# Patient Record
Sex: FEMALE | Race: BLACK OR AFRICAN AMERICAN | Employment: UNEMPLOYED | URBAN - METROPOLITAN AREA
[De-identification: names, ages, dates, MRNs, and addresses within clinical notes are randomized per-mention and may not be internally consistent; named-entity substitution may affect disease eponyms.]

---

## 2017-01-01 ENCOUNTER — APPOINTMENT (OUTPATIENT)
Dept: GENERAL RADIOLOGY | Age: 62
End: 2017-01-01
Attending: EMERGENCY MEDICINE
Payer: MEDICARE

## 2017-01-01 ENCOUNTER — HOSPITAL ENCOUNTER (EMERGENCY)
Age: 62
End: 2017-01-28
Attending: EMERGENCY MEDICINE | Admitting: EMERGENCY MEDICINE
Payer: MEDICARE

## 2017-01-01 DIAGNOSIS — I46.9 CARDIAC ARREST (HCC): Primary | ICD-10-CM

## 2017-01-01 LAB — TROPONIN I BLD-MCNC: 0.05 NG/ML (ref 0–0.08)

## 2017-01-01 PROCEDURE — 80047 BASIC METABLC PNL IONIZED CA: CPT

## 2017-01-01 PROCEDURE — 74011250636 HC RX REV CODE- 250/636: Performed by: EMERGENCY MEDICINE

## 2017-01-01 PROCEDURE — 93005 ELECTROCARDIOGRAM TRACING: CPT

## 2017-01-01 PROCEDURE — 92950 HEART/LUNG RESUSCITATION CPR: CPT

## 2017-01-01 PROCEDURE — 99285 EMERGENCY DEPT VISIT HI MDM: CPT

## 2017-01-01 PROCEDURE — 74011000250 HC RX REV CODE- 250: Performed by: EMERGENCY MEDICINE

## 2017-01-01 PROCEDURE — 84484 ASSAY OF TROPONIN QUANT: CPT

## 2017-01-01 RX ORDER — EPINEPHRINE 0.1 MG/ML
1 INJECTION INTRACARDIAC; INTRAVENOUS
Status: COMPLETED | OUTPATIENT
Start: 2017-01-01 | End: 2017-01-01

## 2017-01-01 RX ORDER — EPINEPHRINE 0.1 MG/ML
1 INJECTION INTRACARDIAC; INTRAVENOUS
Status: DISCONTINUED | OUTPATIENT
Start: 2017-01-01 | End: 2017-01-01 | Stop reason: HOSPADM

## 2017-01-01 RX ORDER — EPINEPHRINE HCL IN DEXTROSE 5% 4MG/250ML
PLASTIC BAG, INJECTION (ML) INTRAVENOUS
Status: DISCONTINUED
Start: 2017-01-01 | End: 2017-01-01 | Stop reason: HOSPADM

## 2017-01-01 RX ORDER — EPINEPHRINE HCL IN DEXTROSE 5% 4MG/250ML
1-10 PLASTIC BAG, INJECTION (ML) INTRAVENOUS
Status: DISCONTINUED | OUTPATIENT
Start: 2017-01-01 | End: 2017-01-01 | Stop reason: HOSPADM

## 2017-01-01 RX ORDER — SODIUM BICARBONATE 84 MG/ML
50 INJECTION, SOLUTION INTRAVENOUS
Status: COMPLETED | OUTPATIENT
Start: 2017-01-01 | End: 2017-01-01

## 2017-01-01 RX ORDER — AMIODARONE HYDROCHLORIDE 150 MG/3ML
300 INJECTION, SOLUTION INTRAVENOUS
Status: COMPLETED | OUTPATIENT
Start: 2017-01-01 | End: 2017-01-01

## 2017-01-01 RX ADMIN — AMIODARONE HYDROCHLORIDE 300 MG: 50 INJECTION, SOLUTION INTRAVENOUS at 14:02

## 2017-01-01 RX ADMIN — EPINEPHRINE 1 MG: 0.1 INJECTION, SOLUTION ENDOTRACHEAL; INTRACARDIAC; INTRAVENOUS at 14:21

## 2017-01-01 RX ADMIN — SODIUM BICARBONATE 50 MEQ: 84 INJECTION, SOLUTION INTRAVENOUS at 14:54

## 2017-01-01 RX ADMIN — EPINEPHRINE 1 MG: 0.1 INJECTION, SOLUTION ENDOTRACHEAL; INTRACARDIAC; INTRAVENOUS at 13:59

## 2017-01-01 RX ADMIN — EPINEPHRINE 1 MG: 0.1 INJECTION, SOLUTION ENDOTRACHEAL; INTRACARDIAC; INTRAVENOUS at 14:11

## 2017-01-28 NOTE — ED NOTES
Patient arrived in room 14 at this time with CPR in progress. According to EMS patient sitting in  seat with  in passenger and patient slumped over.    EMs found patient with agonal  respirations and in PEA

## 2017-01-28 NOTE — PROGRESS NOTES
responded to Death of  Micahel Topete, who was a 62 y.o.,female,     The  provided the following Interventions for Patient Family:  Provided crisis pastoral care, pastoral support and grief interventions. Offered prayers on behalf of the patient family. Chart reviewed. Plan:  Chaplains will continue to follow and will provide pastoral care on an as needed/requested basis and grief support for the family. Dami Koch M.Div.   [facility] [address]  [phone]

## 2017-01-28 NOTE — ED NOTES
Consult:  Discussed care with Dr. Bonner. Standard discussion; including history of patients chief complaint, available diagnostic results, and treatment course.   He will fill out the death certificate

## 2017-01-28 NOTE — ED PROVIDER NOTES
HPI Comments: 2:10 PM Boy Hernandes is a 62 y.o. Female with a hx of diabetes, HTN, and stroke presenting to the ED via EMS in cardiac arrest that began 30 mins prior to ED arrival. The patient was found unresponsive at the scene. Per EMS, spouse who was also at scene states that she got in the car, started the ignition, and collapsed. EMS reports agonal breathing upon arrival, blood glucose of 370. EMS also states that in route the patient lost pulses and was given 1mg of epi 5 mins prior to ED arrival, intubated with a 7.5 in tube without complication (ETV increase to 24). There are no other concerns at this time. Initial rhythm PEA, no shocks, ACLS for 20 mins. The history is provided by the EMS personnel and a friend. Past Medical History:   Diagnosis Date    Diabetes ([address]     Hypertension     PBA (pseudobulbar affect)     Stroke [facility])        Past Surgical History:   Procedure Laterality Date    Vascular surgery procedure unlist  2013     To help with circulation         No family history on file. Social History     Social History    Marital status:      Spouse name: N/A    Number of children: N/A    Years of education: N/A     Occupational History    Not on file. Social History Main Topics    Smoking status: Current Every Day Smoker     Packs/day: 0.50    Smokeless tobacco: Never Used    Alcohol use No    Drug use: No    Sexual activity: Not on file     Other Topics Concern    Not on file     Social History Narrative         ALLERGIES: Review of patient's allergies indicates no known allergies. Review of Systems   Unable to perform ROS: Acuity of condition       There were no vitals filed for this visit.          Physical Exam   Constitutional:   General:  Obtunded, no spontaneous movements  Head:  Normocephalic atraumatic  Eyes:  Pupils 7 mm, unresponsive  Nose:  Inspection grossly normal  Ears:  Grossly normal to inspection  Mouth:  Mucous membrane members moist  Neck:  Trachea midline, no asymmetry no JVD  Chest:  Grossly normal inspection  Pulmonary:  Symmetric breath sounds appreciated with ventilation  Cardiovascular:  Absent peripheral pulses without CPR  Abdomen: Soft, nondistended  Extremities:  Grossly normal to inspection  Neurologic:  Obtunded, GCS 3        MDM  Number of Diagnoses or Management Options  Diagnosis management comments: Patient seen immediatley on arrival.      Confirmed pulseless    Patient transferred to [facility], CPR continued, placed on cardiac monitor    Please seen nursing documented code sheet for specific medications administered. ACLS guidlness followed. Patient had a episode of ventricular tachycardia did shock him once and had a brief return of spontaneous circulation. EKG was suspicious for ST MI, there were inferior elevations, STEMI code was called, but the patient had lost pulses and prior to cardiology evaluation. CPR was resumed approximately 15 minutes, the presenting rhythm was PEA, the final result was PEA, patient had very poor prognosis since no return of spontaneous circulation and had been down for more than one hour. Time of death called at 0     reports that she had been feeling LEFT leg pain prior to the event    Disposition:          Portions of this chart were created with Dragon medical speech to text program.   Unrecognized errors may be present.       ED Course       Procedures

## 2017-01-28 NOTE — ED NOTES
1349  Patient arrived CPR in progress  1353 epi cpr   1354 bicarb cpr  1356 cpr cont   1358 pulse check  PEA  Cont cpr  1359 epi cpr  1400 pulse v-tach shock 200j cpr  1403 pulse check  PEA cpr   1411 epi cpr  1413 pulse check PEA  Cont cpr  5972 epi  cpr  0333 patient still in PEA   Ultrasound  No cardiac activity pupils 7 fixed.     1423

## 2017-01-29 LAB
BUN BLD-MCNC: 7 MG/DL (ref 7–18)
CA-I BLD-MCNC: 0.61 MMOL/L (ref 1.12–1.32)
CHLORIDE BLD-SCNC: 125 MMOL/L (ref 100–108)
CO2 BLD-SCNC: 9 MMOL/L (ref 19–24)
CREAT UR-MCNC: 0.2 MG/DL (ref 0.6–1.3)
GLUCOSE BLD STRIP.AUTO-MCNC: 243 MG/DL (ref 74–106)
HCT VFR BLD CALC: <15 % (ref 36–49)
POTASSIUM BLD-SCNC: <2 MMOL/L (ref 3.5–5.5)
SODIUM BLD-SCNC: 156 MMOL/L (ref 136–145)

## 2017-01-30 LAB
ATRIAL RATE: 64 BPM
CALCULATED R AXIS, ECG10: 68 DEGREES
CALCULATED T AXIS, ECG11: -32 DEGREES
DIAGNOSIS, 93000: NORMAL
Q-T INTERVAL, ECG07: 430 MS
QRS DURATION, ECG06: 160 MS
QTC CALCULATION (BEZET), ECG08: 425 MS
VENTRICULAR RATE, ECG03: 59 BPM